# Patient Record
Sex: MALE | Race: WHITE | URBAN - METROPOLITAN AREA
[De-identification: names, ages, dates, MRNs, and addresses within clinical notes are randomized per-mention and may not be internally consistent; named-entity substitution may affect disease eponyms.]

---

## 2021-04-07 ENCOUNTER — ADULT PHYSICAL (OUTPATIENT)
Dept: URBAN - METROPOLITAN AREA CLINIC 80 | Facility: CLINIC | Age: 81
End: 2021-04-07
Payer: MEDICARE

## 2021-04-07 PROCEDURE — 99203 OFFICE O/P NEW LOW 30 MIN: CPT | Performed by: NURSE PRACTITIONER

## 2021-04-07 RX ORDER — OXYBUTYNIN CHLORIDE 5 MG/1
5 MG TABLET ORAL
Qty: 0 | Refills: 0 | Status: ACTIVE
Start: 2021-04-07

## 2021-04-14 ENCOUNTER — OFFICE VISIT (OUTPATIENT)
Dept: URBAN - METROPOLITAN AREA CLINIC 80 | Facility: CLINIC | Age: 81
End: 2021-04-14
Payer: MEDICARE

## 2021-04-14 DIAGNOSIS — H25.811 COMBINED FORMS OF AGE-RELATED CATARACT, RIGHT EYE: ICD-10-CM

## 2021-04-14 DIAGNOSIS — H40.1134 PRIMARY OPEN-ANGLE GLAUCOMA, BILATERAL, INDETERMINATE STAGE: Primary | ICD-10-CM

## 2021-04-14 PROCEDURE — 99204 OFFICE O/P NEW MOD 45 MIN: CPT | Performed by: OPHTHALMOLOGY

## 2021-04-14 ASSESSMENT — INTRAOCULAR PRESSURE
OS: 33
OD: 21
OS: 13

## 2021-04-14 NOTE — IMPRESSION/PLAN
Impression: Combined forms of age-related cataract, right eye: H25.811. Plan: Patient will consider cataract surgery OD following cataract surgery OS. Recommend surgery OD Aim OD: [place]. Recommend ORA. Recommend monofocal IOL due to glaucoma. Recommend OMNI 180+180 at time of cataract surgery.

## 2021-04-14 NOTE — IMPRESSION/PLAN
Impression: Combined forms of age-related cataract, left eye: H25.812. Plan: Vision loss from glaucoma. Elevated IOP despite 3 glaucoma gtts. Recommend proceeding with cataract surgery OS, with OMNI 360+180 OS. Discussed cataracts, treatment options, and surgical risks/benefits with patient. Patient elects surgical treatment. Recommend surgery OS, Aim OS: -0.25. Recommend ORA. Recommend monofocal IOL due to glaucoma.

## 2021-04-14 NOTE — IMPRESSION/PLAN
Impression: Primary open-angle glaucoma, bilateral, indeterminate stage: H40.1134. Plan: Vision loss from glaucoma. Elevated IOP despite 3 glaucoma gtts. Discussed SLT procedure, RBAs, in detail with patient. Advised that SLT is not likely to reduce IOP enough. Recommend proceeding with cataract surgery OS, with OMNI 360+180 OS. Pt will consider cataract surgery OD as well, with OMNI 180+180 OD.

## 2021-05-19 ENCOUNTER — ADULT PHYSICAL (OUTPATIENT)
Dept: URBAN - METROPOLITAN AREA CLINIC 80 | Facility: CLINIC | Age: 81
End: 2021-05-19
Payer: MEDICARE

## 2021-05-19 DIAGNOSIS — H25.812 COMBINED FORMS OF AGE-RELATED CATARACT, LEFT EYE: ICD-10-CM

## 2021-05-19 DIAGNOSIS — Z01.818 ENCOUNTER FOR OTHER PREPROCEDURAL EXAMINATION: Primary | ICD-10-CM

## 2021-05-19 PROCEDURE — 99213 OFFICE O/P EST LOW 20 MIN: CPT | Performed by: NURSE PRACTITIONER

## 2021-05-24 ENCOUNTER — SURGERY (OUTPATIENT)
Dept: URBAN - METROPOLITAN AREA SURGERY 50 | Facility: SURGERY | Age: 81
End: 2021-05-24
Payer: MEDICARE

## 2021-05-24 DIAGNOSIS — H25.813 COMBINED FORMS OF AGE-RELATED CATARACT, BILATERAL: Primary | ICD-10-CM

## 2022-03-30 ENCOUNTER — OFFICE VISIT (OUTPATIENT)
Dept: URBAN - METROPOLITAN AREA CLINIC 80 | Facility: CLINIC | Age: 82
End: 2022-03-30
Payer: MEDICARE

## 2022-03-30 PROCEDURE — 99213 OFFICE O/P EST LOW 20 MIN: CPT | Performed by: OPHTHALMOLOGY

## 2022-03-30 ASSESSMENT — INTRAOCULAR PRESSURE
OD: 20
OS: 20

## 2022-03-30 NOTE — IMPRESSION/PLAN
Impression: Primary open-angle glaucoma, bilateral, indeterminate stage: H40.1134. Plan: Consider stopping the Cosopt due to allergies. Pt take FML BID OU to help with allergies of the eyes and cortisteroid cream on eyelids to help with redness. Consider using  Discussed diagnosis in detail with patient. Discussed treatment options with patient. Check IOP with Dr. Scott Rodriguez in 2 weeks.

## 2022-05-09 ENCOUNTER — OFFICE VISIT (OUTPATIENT)
Dept: URBAN - METROPOLITAN AREA CLINIC 64 | Facility: CLINIC | Age: 82
End: 2022-05-09
Payer: MEDICARE

## 2022-05-09 DIAGNOSIS — H25.811 COMBINED FORMS OF AGE-RELATED CATARACT, RIGHT EYE: ICD-10-CM

## 2022-05-09 DIAGNOSIS — H26.492 OTHER SECONDARY CATARACT, LEFT EYE: ICD-10-CM

## 2022-05-09 PROCEDURE — 99204 OFFICE O/P NEW MOD 45 MIN: CPT | Performed by: OPTOMETRIST

## 2022-05-09 RX ORDER — TIMOLOL MALEATE 5 MG/ML
0.5 % SOLUTION/ DROPS OPHTHALMIC
Qty: 1 | Refills: 11 | Status: INACTIVE
Start: 2022-05-09 | End: 2022-05-13

## 2022-05-09 RX ORDER — LATANOPROST 50 UG/ML
0.005 % SOLUTION OPHTHALMIC
Qty: 1 | Refills: 11 | Status: ACTIVE
Start: 2022-05-09

## 2022-05-09 ASSESSMENT — KERATOMETRY
OS: 42.39
OD: 42.31

## 2022-05-09 ASSESSMENT — INTRAOCULAR PRESSURE
OS: 48
OD: 24

## 2022-05-09 ASSESSMENT — VISUAL ACUITY: OD: 20/30

## 2022-05-09 NOTE — IMPRESSION/PLAN
Impression: Combined forms of age-related cataract, right eye: H25.811. Plan: Discussed sx. He is content with his current level of vision. No change in glasses Rx. Continue to monitor.

## 2022-05-09 NOTE — IMPRESSION/PLAN
Impression: Ocular hypertension, bilateral: H40.053. Plan: Hx of using latanoprost qhs OU and dorz/shelton bid OS along with FML qd OU. Irritation OS so Dr. Marily Aragon recommended stopping dorz/shelton and increasing FML to bid OU. Irritation has improved. IOP is elevated OU today. No discomfort OS. 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 Continue latanoprost qhs OU. Add timolol qam OU (denies heart or lung problems). Stop [address] [facility] OU. RTC for IOP check in 2 weeks.

## 2022-05-09 NOTE — IMPRESSION/PLAN
Impression: Other secondary cataract, left eye: H26.492. Plan: No tx recommended yet. 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 Hx of OMNI OS.

## 2022-05-23 ENCOUNTER — OFFICE VISIT (OUTPATIENT)
Dept: URBAN - METROPOLITAN AREA CLINIC 64 | Facility: CLINIC | Age: 82
End: 2022-05-23
Payer: MEDICARE

## 2022-05-23 DIAGNOSIS — H40.053 OCULAR HYPERTENSION, BILATERAL: Primary | ICD-10-CM

## 2022-05-23 PROCEDURE — 99213 OFFICE O/P EST LOW 20 MIN: CPT | Performed by: OPTOMETRIST

## 2022-05-23 ASSESSMENT — INTRAOCULAR PRESSURE
OS: 44
OD: 20
OD: 19
OS: 30

## 2022-05-23 NOTE — IMPRESSION/PLAN
Impression: Ocular hypertension, bilateral: H40.053. Plan: Hx of using latanoprost qhs OU and dorz/shelton bid OS along with FML qd OU. Irritation OS so Dr. Whitmore Jame recommended stopping dorz/shelton and increasing FML to bid OU. Irritation has improved. IOP was elevated OU last visit so we tried timolol and dorzolamide both separately and one at a time and he cannot tolerate either of them. We'll try brimonidine . 2% bid OU. Sample given. Continue with latanoprost qhs OU and FML bid OU. RTC for IOP check in 2 weeks. If he cannot tolerate brimonidine then we can consider SLT or referral to glaucoma specialist.  Note:  No discomfort OS. 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

## 2022-08-31 ENCOUNTER — OFFICE VISIT (OUTPATIENT)
Dept: URBAN - METROPOLITAN AREA CLINIC 80 | Facility: CLINIC | Age: 82
End: 2022-08-31
Payer: MEDICARE

## 2022-08-31 DIAGNOSIS — H25.11 AGE-RELATED NUCLEAR CATARACT, RIGHT EYE: Primary | ICD-10-CM

## 2022-08-31 DIAGNOSIS — H40.053 OCULAR HYPERTENSION, BILATERAL: ICD-10-CM

## 2022-08-31 PROCEDURE — 99214 OFFICE O/P EST MOD 30 MIN: CPT | Performed by: OPHTHALMOLOGY

## 2022-08-31 ASSESSMENT — INTRAOCULAR PRESSURE
OD: 25
OS: 37

## 2022-08-31 ASSESSMENT — VISUAL ACUITY
OS: HM
OD: 20/40

## 2022-08-31 NOTE — IMPRESSION/PLAN
Impression: Age-related nuclear cataract, right eye: H25.11. Plan: Discussed cataracts, treatment options, and surgical risks/benefits with patient. Patient elects surgical treatment. Recommend surgery OD . Aim OD: Dawn Underwood Recommend ORA. Recommend monofocal IOL, and Istent see notes. NOTES:

Match IOL in OD. Hx of severe Glaucoma

## 2022-08-31 NOTE — IMPRESSION/PLAN
Impression: Ocular hypertension, bilateral: H40.053. Plan: IOP OD:25 OS:37 Continue Latnoprost QHS OU, FML BID OU, Dorz/Timolol BID OU & continue care with referring OD for glaucoma care. Consider IStent or SLT. Had CO with glaucoma specialist, per patient had SLT OS with Specialist in [facility] 2/2022 - recommend FU, since IOP not well controlled. Note:  No discomfort OS. 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

## 2022-10-03 ENCOUNTER — ADULT PHYSICAL (OUTPATIENT)
Dept: URBAN - METROPOLITAN AREA CLINIC 80 | Facility: CLINIC | Age: 82
End: 2022-10-03
Payer: MEDICARE

## 2022-10-03 DIAGNOSIS — H25.11 AGE-RELATED NUCLEAR CATARACT, RIGHT EYE: ICD-10-CM

## 2022-10-03 DIAGNOSIS — Z01.818 ENCOUNTER FOR OTHER PREPROCEDURAL EXAMINATION: Primary | ICD-10-CM

## 2022-10-03 PROCEDURE — 99213 OFFICE O/P EST LOW 20 MIN: CPT | Performed by: PHYSICIAN ASSISTANT
